# Patient Record
Sex: FEMALE | Race: WHITE | NOT HISPANIC OR LATINO
[De-identification: names, ages, dates, MRNs, and addresses within clinical notes are randomized per-mention and may not be internally consistent; named-entity substitution may affect disease eponyms.]

---

## 2021-01-04 PROBLEM — Z00.00 ENCOUNTER FOR PREVENTIVE HEALTH EXAMINATION: Status: ACTIVE | Noted: 2021-01-04

## 2021-01-05 ENCOUNTER — RESULT REVIEW (OUTPATIENT)
Age: 61
End: 2021-01-05

## 2021-01-05 ENCOUNTER — APPOINTMENT (OUTPATIENT)
Dept: ORTHOPEDIC SURGERY | Facility: CLINIC | Age: 61
End: 2021-01-05
Payer: MEDICAID

## 2021-01-05 ENCOUNTER — OUTPATIENT (OUTPATIENT)
Dept: OUTPATIENT SERVICES | Facility: HOSPITAL | Age: 61
LOS: 1 days | End: 2021-01-05
Payer: COMMERCIAL

## 2021-01-05 VITALS — OXYGEN SATURATION: 99 % | DIASTOLIC BLOOD PRESSURE: 80 MMHG | SYSTOLIC BLOOD PRESSURE: 118 MMHG | HEART RATE: 76 BPM

## 2021-01-05 VITALS — WEIGHT: 120 LBS | HEIGHT: 66 IN | BODY MASS INDEX: 19.29 KG/M2

## 2021-01-05 PROCEDURE — 99072 ADDL SUPL MATRL&STAF TM PHE: CPT

## 2021-01-05 PROCEDURE — 72082 X-RAY EXAM ENTIRE SPI 2/3 VW: CPT

## 2021-01-05 PROCEDURE — 72084 X-RAY EXAM ENTIRE SPI 6/> VW: CPT

## 2021-01-05 PROCEDURE — 72084 X-RAY EXAM ENTIRE SPI 6/> VW: CPT | Mod: 26

## 2021-01-05 PROCEDURE — 99204 OFFICE O/P NEW MOD 45 MIN: CPT

## 2021-01-05 PROCEDURE — 72110 X-RAY EXAM L-2 SPINE 4/>VWS: CPT

## 2021-01-07 DIAGNOSIS — M43.16 SPONDYLOLISTHESIS, LUMBAR REGION: ICD-10-CM

## 2021-01-07 DIAGNOSIS — M51.36 OTHER INTERVERTEBRAL DISC DEGENERATION, LUMBAR REGION: ICD-10-CM

## 2021-01-07 DIAGNOSIS — M40.204 UNSPECIFIED KYPHOSIS, THORACIC REGION: ICD-10-CM

## 2021-01-07 DIAGNOSIS — M47.816 SPONDYLOSIS WITHOUT MYELOPATHY OR RADICULOPATHY, LUMBAR REGION: ICD-10-CM

## 2021-01-07 DIAGNOSIS — M41.86 OTHER FORMS OF SCOLIOSIS, LUMBAR REGION: ICD-10-CM

## 2021-01-07 DIAGNOSIS — M41.9 SCOLIOSIS, UNSPECIFIED: ICD-10-CM

## 2021-01-21 ENCOUNTER — APPOINTMENT (OUTPATIENT)
Dept: RADIOLOGY | Facility: CLINIC | Age: 61
End: 2021-01-21
Payer: MEDICAID

## 2021-01-21 ENCOUNTER — OUTPATIENT (OUTPATIENT)
Dept: OUTPATIENT SERVICES | Facility: HOSPITAL | Age: 61
LOS: 1 days | End: 2021-01-21

## 2021-01-21 ENCOUNTER — APPOINTMENT (OUTPATIENT)
Dept: PHYSICAL MEDICINE AND REHAB | Facility: CLINIC | Age: 61
End: 2021-01-21
Payer: MEDICAID

## 2021-01-21 ENCOUNTER — TRANSCRIPTION ENCOUNTER (OUTPATIENT)
Age: 61
End: 2021-01-21

## 2021-01-21 VITALS
DIASTOLIC BLOOD PRESSURE: 108 MMHG | BODY MASS INDEX: 19.29 KG/M2 | TEMPERATURE: 97.9 F | HEART RATE: 44 BPM | SYSTOLIC BLOOD PRESSURE: 142 MMHG | WEIGHT: 120 LBS | RESPIRATION RATE: 17 BRPM | HEIGHT: 66 IN

## 2021-01-21 DIAGNOSIS — Z86.59 PERSONAL HISTORY OF OTHER MENTAL AND BEHAVIORAL DISORDERS: ICD-10-CM

## 2021-01-21 DIAGNOSIS — Z86.39 PERSONAL HISTORY OF OTHER ENDOCRINE, NUTRITIONAL AND METABOLIC DISEASE: ICD-10-CM

## 2021-01-21 DIAGNOSIS — Z86.69 PERSONAL HISTORY OF OTHER DISEASES OF THE NERVOUS SYSTEM AND SENSE ORGANS: ICD-10-CM

## 2021-01-21 DIAGNOSIS — Z78.9 OTHER SPECIFIED HEALTH STATUS: ICD-10-CM

## 2021-01-21 DIAGNOSIS — Z80.8 FAMILY HISTORY OF MALIGNANT NEOPLASM OF OTHER ORGANS OR SYSTEMS: ICD-10-CM

## 2021-01-21 DIAGNOSIS — M25.651 STIFFNESS OF RIGHT HIP, NOT ELSEWHERE CLASSIFIED: ICD-10-CM

## 2021-01-21 PROCEDURE — 99072 ADDL SUPL MATRL&STAF TM PHE: CPT

## 2021-01-21 PROCEDURE — 73522 X-RAY EXAM HIPS BI 3-4 VIEWS: CPT | Mod: 26

## 2021-01-21 PROCEDURE — 99204 OFFICE O/P NEW MOD 45 MIN: CPT

## 2021-01-21 RX ORDER — ZOLPIDEM TARTRATE 5 MG/1
TABLET, FILM COATED ORAL
Refills: 0 | Status: ACTIVE | COMMUNITY

## 2021-01-21 RX ORDER — LEVOTHYROXINE SODIUM 0.17 MG/1
TABLET ORAL
Refills: 0 | Status: ACTIVE | COMMUNITY

## 2021-01-21 RX ORDER — SUMATRIPTAN SUCCINATE 100 MG/1
TABLET, FILM COATED ORAL
Refills: 0 | Status: ACTIVE | COMMUNITY

## 2021-01-21 RX ORDER — SERTRALINE 25 MG/1
TABLET, FILM COATED ORAL
Refills: 0 | Status: ACTIVE | COMMUNITY

## 2021-01-21 RX ORDER — DEXTROAMPHETAMINE SACCHARATE, AMPHETAMINE ASPARTATE, DEXTROAMPHETAMINE SULFATE, AND AMPHETAMINE SULFATE 2.5; 2.5; 2.5; 2.5 MG/1; MG/1; MG/1; MG/1
10 TABLET ORAL
Refills: 0 | Status: ACTIVE | COMMUNITY

## 2021-01-21 NOTE — REVIEW OF SYSTEMS
[Patient Intake Form Reviewed] : Patient intake form was reviewed [Joint Pain] : joint pain [Joint Stiffness] : joint stiffness [Muscle Pain] : muscle pain [Negative] : Heme/Lymph [Muscle Weakness] : no muscle weakness [Headache] : no headaches [Dizziness] : no dizziness [Fainting] : no fainting [Difficulty Walking] : no difficulty walking [de-identified] : difficulty with bending forward and stiffness

## 2021-01-21 NOTE — ASSESSMENT
[FreeTextEntry1] : Ms. SIA GARCIA is a very pleasant 61 year female who seen for evaluation of low back pain and stiffness that has been ongoing for several year, worse in the last 3 years. Based on clinical history, exam and review of imaging likely due to myofascial etiology and imbalance. May have a component of hip pathology contributing. \par -Will start physical therapy \par -Will check-add  bilateral hip xrays and pelvis \par -Continue with conservative care\par -Recommend patient to follow up with primary care doctor for screening of osteoporosis including DEXA scan\par -Return to clinic in 4-6 weeks or sooner as indicated\par To help the patient understand their condition a plastic 3D model was used to better explain.-alirio scoliosis biomechanics \par The patient was given handouts to read on this condition,helpful hints ,exercises etc \par all questions answered\par  I certify that > 50 % of time spent was spent on counselling and coordination of care and more than 50% face to face directly \par Time spent including review of documents /records/decision making /discussion  amounted  > 45 minutes\par \par

## 2021-01-21 NOTE — HISTORY OF PRESENT ILLNESS
[FreeTextEntry1] : Ms. SIA GARCIA is a very pleasant 61 year female who seen for evaluation of low back pain and stiffness that has been ongoing for several year, worse in the last 3 years. Reports that the symptoms first started after yoga accident in 2000. The pain is located primarily lower back, intermittent in nature and described as "achy". The pain is rated as 4/10 during today's visit, and stays at 4/10 at its worse. The patient's symptoms are aggravated by bending forward, sleeping and sitting for long time  she uses a standing desk - and alleviated by heat and ice. Reports that the pain is associated with stiffness that is limiting her mobility. Reports that she is not able to exercise as she did before due to the pain and stiffness (i.e. not able to use the same bike she used to use). The patient denies any  numbness/tingling, weakness, or bowel/bladder dysfunction. Reports occasional radiating pain down the leg.  Reports that she tried therapy in 2015, but did not notice any particular relief and felt that the therapy she was getting was not customized to her needs. The patient has no other complaints at this time. She works as a  and has a standing desk. \par \par

## 2021-01-21 NOTE — DATA REVIEWED
[FreeTextEntry1] : EXAM:  XR SPINE-LS-MIN 4 VIEWS\par \par EXAM:  XR SPINE ENTIRE THOR LUM 2-3V\par \par PROCEDURE DATE:  01/05/2021\par \par \par \par INTERPRETATION:  PROCEDURES:\par Radiographs of the thoracic and lumbar spine, including AP and lateral views and flexion/extension views of the lumbar spine, 6 views total.\par \par CLINICAL INFORMATION: Low back pain\par \par COMPARISON:\par \par FINDINGS:\par \par Thoracic spine:\par Alignment: On lateral imaging, there is straightening of kyphotic curvature. On the AP view, there is minimal undulating curvature of the thoracic spine with mild levocurvature at the T8-9 level.\par Vertebrae: There are 12 rib-bearing thoracic vertebral bodies are preserved in height. No compression deformity or acute/displaced fracture identified.\par Discs: Degenerative disc loss is noted at the T8-9 level with anterior osteophyte lipping. Otherwise, the thoracic discs are preserved in height.\par Miscellaneous: The cervical spine is seen on lateral imaging shows anterolisthesis at C4-5 with greater disc degeneration at C5-6 and C6-7 with straightening and mild reversal of lower cervical lordosis. No prevertebral swelling.\par \par Lumbar spine:\par Alignment: On the AP view, there is an S-shaped curvature of the lumbar spine with dextrocurvature apex at L2 and levocurvature apex at L4-5. There is slight right lateral listhesis of L2 from L1. On the lateral neutral view, there is 4 mm retrolisthesis at the L2-3 level which is not significantly changed with flexion or extension. There is no additional level of listhesis or instability with flexion/extension.\par Flexion/extension: No instability, as mentioned above.\par Vertebrae: No compression deformity\par Discs: There is degenerative disc loss most evident at the L2-3 and L4-5 levels, seemingly left asymmetric at the former in the presence of scoliosis and more diffuse at the latter. Moderate diffuse disc height loss is also seen at L1-2.\par Miscellaneous: Lower lumbar facet osteoarthritis as also noted. The sacroiliac joints are grossly preserved.\par \par IMPRESSION:\par \par Mild S-shaped scoliosis of the lumbar spine and minimal levocurvature of the thoracic spine. There is mild right lateral listhesis of L2 from L1. No thoracolumbar compression deformity.\par \par Sagittal alignment shows straightening of thoracic kyphosis. There is minor retrolisthesis at L2-3 without demonstrable instability on flexion/extension.\par \par Degenerative disc loss at T8-9, L1-2, L2-3 and L4-5, and also seen of the cervical spine at C5-6 and C6-7 with anterolisthesis at C4-5.\par IMPRESSION:\par \par Mild S-shaped scoliosis of the lumbar spine and minimal levocurvature of the thoracic spine. There is mild right lateral listhesis of L2 from L1. No thoracolumbar compression deformity.\par \par Sagittal alignment shows straightening of thoracic kyphosis. There is minor retrolisthesis at L2-3 without demonstrable instability on flexion/extension.\par \par Degenerative disc loss at T8-9, L1-2, L2-3 and L4-5, and also seen of the cervical spine at C5-6 and C6-7 with anterolisthesis at C4-5.\par \par \par \par \par Thank you for the opportunity to participate in the care of this patient.\par \par \par PERFECTO SMILEY MD; Attending Radiologist\par This document has been electronically signed. Jan 5 2021  4:42PM\par \par  \par Xray of lumbar spine 1/5/2021\par IMPRESSION:\par \par Mild S-shaped scoliosis of the lumbar spine and minimal levocurvature of the thoracic spine. There is mild right lateral listhesis of L2 from L1. No thoracolumbar compression deformity.\par \par Sagittal alignment shows straightening of thoracic kyphosis. There is minor retrolisthesis at L2-3 without demonstrable instability on flexion/extension.\par \par Degenerative disc loss at T8-9, L1-2, L2-3 and L4-5, and also seen of the cervical spine at C5-6 and C6-7 with anterolisthesis at C4-5.\par \par MRI lumbar spine 9/2019\par L1-2 minimal posterior disc bulging no stenosis\par L2-3 retrolisthesis. disc bulging, eccentric to the left, but no stenosis\par L3-4 tiny left paracentral/foraminal disc protrusion, but no stenosis. facet arthrosis\par L4-5 retrolisthesis. endplate ridging/ disc contacts the descending L5 nerve roots. mild central stenosis. mild to moderate neural foraminal stenosis, without defined neural impingement\par L5-S1 facet arthrosis. no stenosis\par multisegmental disc degeneration is identified, severe at L4-5, L2-3 and L1-2

## 2021-01-21 NOTE — PHYSICAL EXAM
[Normal] : Oriented to person, place, and time, insight and judgement were intact and the affect was normal [de-identified] : No costovertebral angle tenderness and no spinal tenderness. Protrusion of the lumbar spinous processes, non-tender. No overlying skin changes. No pain with flexion or extension or side bending. No limitation in range of motion [de-identified] : Tight hip flexors bilaterally, worse on the right. Negative SLR, ÁNGEL and FADIR- pain worst with one legged stance R mild hip gridle weakness R  [de-identified] : No long tract signs found on clinical exam and no focal neurological deficits

## 2021-02-09 NOTE — HISTORY OF PRESENT ILLNESS
[de-identified] : Ms. GARCIA is a very pleasant 61 year old female who complains of mild/moderate low back pain for years. There is no radiation of pain.  Patient  denies extremity numbness and paresthesias. Describes as a aching/stiffness, generalized low back weakness. \par \par The patient reports no loss of hand dexterity.\par The patient states there no loss of balance when walking.\par There no sensory loss in the arms or legs\par The patent no difficulty with urination.\par \par The patient no history of previous spine surgery.\par \par The patient has no history of unexpected weight loss, no history of active cancer, no history bladder or bowel dysfunction, no night pain, no fevers or chills.\par \par The past medical history, surgical history, family history, allergies, medications, 10+ point review of systems, family history and social history were reviewed and non contributory.\par \par

## 2021-02-09 NOTE — PHYSICAL EXAM
[de-identified] : General: patient is well developed, well nourished, in no acute \par distress, alert and oriented x 3. \par \par Mood and affect: normal\par \par Respiratory: no respiratory distress noted\par \par Skin: skin intact, no erythema, increased warmth\par \par Alignment:The spine is well compensated in the coronal and sagittal plane.  \par \par Gait: The patient is able to toe walk and heel walk without difficulty. \par \par Palpation: no tenderness to palpation spine or paraspinal region\par \par Range of motion: Lumbar spine ROM is full\par \par Neurologic Exam:\par Motor: Manual Muscle testing in the lower extremities is 5 out of 5 in all muscle groups. There is no evidence of muscular atrophy in the lower extremities. Sensory: Sensation to light touch is grossly intact in the lower extremities\par \par Reflexes: DTR are present and symmetric throughout\par \par Hip Exam: No pain with internal or external rotation of hips bilaterally\par \par Special tests: Straight leg raise test negative.  Cross straight leg test negative.  \par \par Vascular: Examination of the peripheral vascular system demonstrates no evidence of congestion or edema. no evidence of lymphedema bilateral lower extremities\par \par  [de-identified] : XR 1/2021 (my read): multilevel degenerative changes, mild s-shaped thoracolumbar scoliosis, no dynamic instability, no fractures seen

## 2021-02-09 NOTE — DISCUSSION/SUMMARY
[de-identified] : Discussed the results of the patient's history, physical exam, and imaging.  I am recommending a physiatry evaluation, referral to Dr. Olivares given.  The patient will follow up with me as needed.  All questions were answered.\par \par

## 2021-03-31 ENCOUNTER — APPOINTMENT (OUTPATIENT)
Dept: PHYSICAL MEDICINE AND REHAB | Facility: CLINIC | Age: 61
End: 2021-03-31
Payer: MEDICAID

## 2021-03-31 VITALS
SYSTOLIC BLOOD PRESSURE: 103 MMHG | HEIGHT: 66 IN | DIASTOLIC BLOOD PRESSURE: 87 MMHG | WEIGHT: 122 LBS | BODY MASS INDEX: 19.61 KG/M2

## 2021-03-31 VITALS
BODY MASS INDEX: 19.61 KG/M2 | TEMPERATURE: 97.2 F | HEART RATE: 96 BPM | DIASTOLIC BLOOD PRESSURE: 87 MMHG | HEIGHT: 66 IN | SYSTOLIC BLOOD PRESSURE: 103 MMHG | RESPIRATION RATE: 18 BRPM | WEIGHT: 122 LBS

## 2021-03-31 PROCEDURE — 99213 OFFICE O/P EST LOW 20 MIN: CPT

## 2021-03-31 PROCEDURE — 99072 ADDL SUPL MATRL&STAF TM PHE: CPT

## 2021-05-18 ENCOUNTER — NON-APPOINTMENT (OUTPATIENT)
Age: 61
End: 2021-05-18

## 2021-06-08 ENCOUNTER — APPOINTMENT (OUTPATIENT)
Dept: PHYSICAL MEDICINE AND REHAB | Facility: CLINIC | Age: 61
End: 2021-06-08
Payer: MEDICAID

## 2021-06-08 VITALS
WEIGHT: 122 LBS | BODY MASS INDEX: 19.61 KG/M2 | RESPIRATION RATE: 18 BRPM | DIASTOLIC BLOOD PRESSURE: 78 MMHG | TEMPERATURE: 97.8 F | HEIGHT: 66 IN | HEART RATE: 81 BPM | SYSTOLIC BLOOD PRESSURE: 113 MMHG

## 2021-06-08 DIAGNOSIS — M54.9 DORSALGIA, UNSPECIFIED: ICD-10-CM

## 2021-06-08 DIAGNOSIS — M25.69 STIFFNESS OF OTHER SPECIFIED JOINT, NOT ELSEWHERE CLASSIFIED: ICD-10-CM

## 2021-06-08 DIAGNOSIS — M47.819 SPONDYLOSIS W/OUT MYELOPATHY OR RADICULOPATHY, SITE UNSPECIFIED: ICD-10-CM

## 2021-06-08 PROCEDURE — 99214 OFFICE O/P EST MOD 30 MIN: CPT

## 2021-06-08 PROCEDURE — 99072 ADDL SUPL MATRL&STAF TM PHE: CPT

## 2021-06-08 NOTE — PHYSICAL EXAM
[FreeTextEntry1] : PHYSICAL EXAM : OBJECTIVE \par \par GENERAL : Awake ,alert and oriented to time place and person \par HEAD : normocephalic and atraumatic \par NECK : supple ,no tracheal deviation ,no thyroid enlargement noted with swallowing\par EYES : sclera and conjunctiva normal no redness,intact extraocular movements \par ENT  : ears and nose normal in appearance -hearing adequate \par PULMONARY: effort normal. No respiratory distress. breathing regular. No wheezes \par LYMPH : No swelling in limbs, capillary return within normal range \par CVS : warm extremities,no palpitations,not short of breath, no visible jugular venous distention\par PSYCH : mood and affect normal ,good eye contact ,normal attention \par ABDOMEN : no visible distension , \par NEUROLOGICAL:cranial nerves intact,muscle tone normal,gait and balance safe except where noted below \par SKIN : warm and dry No rash detected over specific body areas examined \par MUSCULOSKELETAL: normal muscle bulk, no focal bony tenderness /posture normal except where specified below\par Good ROM spine for her age \par SLR neg \par gait NL

## 2021-06-08 NOTE — REVIEW OF SYSTEMS
[Patient Intake Form Reviewed] : Patient intake form was reviewed [Negative] : Heme/Lymph [Fatigue] : no fatigue [Recent Change In Weight] : ~T no recent weight change [Lower Ext Edema] : no lower extremity edema

## 2021-06-08 NOTE — ASSESSMENT
[FreeTextEntry1] : \par PLAN AND RECOMMENDATIONS :\par \par We discussed  clinical impression\par explained that being 100 % pain free in ones 60 s a rarity \par exercise is the key \par as she wants to re introduce more mobility into her HEP we agree to a 4 week program once a week \par she is to bring a medical clearance from her cardiologist \par \par Recommend\par .symptomatic care and support\par  medications NSAIDS as needed -  OTC fine (-personal preference )-(once or twice a day), -warned of  possible GI side effects -advised to take with meals or add over the counter Nexium, if sensitive\par again fears that an advil " will kill my liver " explained that symptom control important and occas advil will not harm her\par  imaging not needed \par  referral to PT see above \par  hydrotherapy /heat / cold for pain\par  continue  spinal precautions including care with bending, lifting, twisting and  carrying.\par \par  relative rest and avoidance of painful activity where possible \par  increasing activity as discussed discussed yoga for LBP \par  return for follow up prn \par

## 2021-10-05 ENCOUNTER — APPOINTMENT (OUTPATIENT)
Dept: ORTHOPEDIC SURGERY | Facility: CLINIC | Age: 61
End: 2021-10-05

## 2021-10-26 ENCOUNTER — APPOINTMENT (OUTPATIENT)
Dept: ORTHOPEDIC SURGERY | Facility: CLINIC | Age: 61
End: 2021-10-26
Payer: MEDICAID

## 2021-10-26 DIAGNOSIS — M54.50 LOW BACK PAIN, UNSPECIFIED: ICD-10-CM

## 2021-10-26 DIAGNOSIS — M41.20 OTHER IDIOPATHIC SCOLIOSIS, SITE UNSPECIFIED: ICD-10-CM

## 2021-10-26 DIAGNOSIS — G89.29 LOW BACK PAIN, UNSPECIFIED: ICD-10-CM

## 2021-10-26 PROCEDURE — 99214 OFFICE O/P EST MOD 30 MIN: CPT

## 2021-10-26 NOTE — DISCUSSION/SUMMARY
[de-identified] : discussed option for further work up with lumbar mri.  patient does not wish to pursue.  discussed options including trigger point injections for this left lumbar source of pain.  discussed other options for her back pain such as lumbar rfa for her L1-3 disc degeneration, likely facet arthropathy and focal scoliosis.  patient will consider her options and will see dr. lux if interested in interventional spine procedures for pain.  does not need surgery at this time.  all questions answered.

## 2021-10-26 NOTE — PHYSICAL EXAM
[de-identified] : Physical Exam:\par \par General: patient is well developed, well nourished, in no acute \par distress, alert and oriented x 3. \par \par Mood and affect: normal\par \par Respiratory: no respiratory distress noted\par \par Skin: no scars over spine, skin intact, no erythema, increased warmth\par \par Alignment:The spine is well compensated in the coronal and sagittal plane.  There is no asymmetry on the corona forward bend test\par \par Gait: The patient is able to toe walk and heel walk without difficulty. The patient is able to tandem gait without difficutly.\par \par Palpation: no tenderness to palpation spine or paraspinal region\par \par Range of motion: Lumbar spine ROM is full\par \par Neurologic Exam:\par Motor: Manual Muscle testing in the lower extremities is 5 out of 5 in all muscle groups. There is no evidence of muscular atrophy in the lower extremities. Sensory: Sensation to light touch is grossly intact in the lower extremities\par \par Reflexes: DTR are present and symmetric throughout, negative pickard bilaterally, negative inverted radial reflex bilaterally, no clonus, plantar responses are flexor\par \par Hip Exam: No pain with internal or external rotation of hips bilaterally\par \par Special tests: Straight leg raise test negative.  Cross straight leg test negative.  LLUVIA test negative\par \par Vascular: Examination of the peripheral vascular system demonstrates no evidence of congestion or edema. no evidence of lymphadema bilateral lower extremities, pulses are present and symmetric in both lower extremities. [de-identified] : no new imaging today

## 2021-10-26 NOTE — HISTORY OF PRESENT ILLNESS
[de-identified] : last seen 1/2021.  has been doing pt.  was doing well until 6 months ago when developed new left lower back discomfort.  has been stretching and exercising.  feels that with that regimen she is managing better.  does pilates and barre.  stretches daily.  no radiating pains.  takes advil approx every other day for back discomfort.  pain rates as mild to moderate.  able to do her activities despite the pain  no numbness/tingling.

## 2021-11-15 ENCOUNTER — APPOINTMENT (OUTPATIENT)
Dept: PHYSICAL MEDICINE AND REHAB | Facility: CLINIC | Age: 61
End: 2021-11-15
Payer: MEDICAID

## 2021-12-17 ENCOUNTER — APPOINTMENT (OUTPATIENT)
Dept: PHYSICAL MEDICINE AND REHAB | Facility: CLINIC | Age: 61
End: 2021-12-17
Payer: MEDICAID

## 2021-12-17 VITALS — OXYGEN SATURATION: 99 % | BODY MASS INDEX: 19.61 KG/M2 | WEIGHT: 122 LBS | HEIGHT: 66 IN | RESPIRATION RATE: 16 BRPM

## 2021-12-17 DIAGNOSIS — M43.16 SPONDYLOLISTHESIS, LUMBAR REGION: ICD-10-CM

## 2021-12-17 PROCEDURE — 99213 OFFICE O/P EST LOW 20 MIN: CPT

## 2021-12-17 NOTE — ASSESSMENT
[FreeTextEntry1] : Impression:\par 1. Lumbar Spondylosis\par \par Plan: The history and physical examination were reviewed. The diagnosis was discussed with the patient along with treatment options including physical therapy, oral medication, interventional spine procedures, and surgery; as well as alternative therapeutics such as acupuncture and massage. We also discussed the importance of general exercise, ergonomics, and posture as they pertain to the current condition as well as overall health and well being. I am recommending that the patient undergo an MRI of the LSpine to further evaluate in preparation for possible interventional procedures. Due to insurance issues the patient will follow up with an outside physician moving forward. She was given several recommendations. The patient expressed verbal understanding and is in agreement with the plan of care. All of the patient's questions and concerns were addressed during today's visit.\par

## 2021-12-17 NOTE — HISTORY OF PRESENT ILLNESS
[FreeTextEntry1] : Referring Physician: Dr. Thomas Scott MD \par \par Ms. SIA GARCIA is a very pleasant 61 year female who comes in for evaluation of Lower back pain that has been ongoing for over 15 years without any specific injury or inciting event. Patient has tried Advil, Ice and PT which helped. The pain is located primarily on her lower back non-radiating intermittent and described as achy and sharp. The pain is rated as 4/10 and ranges from 2-6/10. The patient's symptoms are aggravated by extreme bending and alleviated by ice, Advil and stretching. The patient works as a Writer which consists of sitting. The patient denies any night pain, numbness/tingling, weakness, or bowel/bladder dysfunction. The patient has no other complaints at this time.\par

## 2021-12-17 NOTE — PHYSICAL EXAM
[FreeTextEntry1] : LUMBAR\par GEN: AAOx3. NAD.\par INSP: Spine alignment midline. No evidence of scoliosis.\par STANCE: Trendelenburg/SLS (-) R (-) L. \par GAIT: (-) Antalgic. Normal reciprocating heel to toe\par SENS: Grossly intact to LT BLE.\par REFLEXES: Symmetric patella, achilles. \par TONE: Normal. No clonus.\par SPECIAL: SLR/Slump (-) R (-) L.   Gaenslen (-) R (-) L.\par                 FADIR (-) R (-) L.          LLUVIA (-) R (-) L.  \par PALP: Midline/Spinous processes (-).   Paraspinals (-) R  (-) L.   \par            QL (-) R (-) L.      SIJ (-) R (-) L.            GTB (-) R (-) L.\par \par LS ROM:                                                              \par Flex               Full.    Axial Sx (-).    LE Sx R (-) L (-).\par Ext                 Full.    Axial Sx (-).    LE Sx R (-) L (-).\par Lat Flex         Full.    Axial Sx (-).    LE Sx R (-) L (-).       \par Rotation         Full.    Axial Sx (-).    LE Sx R (-) L (-). \par Oblique Ext    Full.    Axial Sx (-).    LE Sx R (-) L (-).  \par \par HIP ROM:      RIGHT           LEFT\par Flex             Full. (-)          Full. (-)\par IR                 Full. (-)          Full. (-)\par ER                Full. (-)          Full. (-)\par \par STRENGTH:    RIGHT      LEFT\par Hip Flex            5/5 5/5\par Hip ABd            5/5 5/5\par Knee Ext           5/5 5/5\par Ankle DF           5/5 5/5\par Ankle PF           5/5 5/5\par EHL                   5/5 5/5\par EV                    5/5 5/5

## 2021-12-17 NOTE — DATA REVIEWED
[MRI] : MRI [FreeTextEntry1] : XR LSPINE 01/2021: Degenerative disc loss at L2-3 and L4-5. Lower lumbar facet osteoarthritis.

## 2022-04-11 PROBLEM — M41.20 SCOLIOSIS (AND KYPHOSCOLIOSIS), IDIOPATHIC: Status: ACTIVE | Noted: 2021-01-21
